# Patient Record
Sex: MALE | Race: BLACK OR AFRICAN AMERICAN | NOT HISPANIC OR LATINO | Employment: PART TIME | URBAN - METROPOLITAN AREA
[De-identification: names, ages, dates, MRNs, and addresses within clinical notes are randomized per-mention and may not be internally consistent; named-entity substitution may affect disease eponyms.]

---

## 2018-08-24 ENCOUNTER — EMPLOYEE HEALTH (OUTPATIENT)
Dept: OCCUPATIONAL MEDICINE | Facility: CLINIC | Age: 23
End: 2018-08-24

## 2018-08-24 ENCOUNTER — HOSPITAL ENCOUNTER (OUTPATIENT)
Facility: MEDICAL CENTER | Age: 23
End: 2018-08-24
Attending: PREVENTIVE MEDICINE
Payer: COMMERCIAL

## 2018-08-24 ENCOUNTER — EH NON-PROVIDER (OUTPATIENT)
Dept: OCCUPATIONAL MEDICINE | Facility: CLINIC | Age: 23
End: 2018-08-24

## 2018-08-24 VITALS
RESPIRATION RATE: 16 BRPM | OXYGEN SATURATION: 97 % | BODY MASS INDEX: 20.49 KG/M2 | WEIGHT: 123 LBS | DIASTOLIC BLOOD PRESSURE: 54 MMHG | HEIGHT: 65 IN | SYSTOLIC BLOOD PRESSURE: 118 MMHG | HEART RATE: 78 BPM | TEMPERATURE: 98.7 F

## 2018-08-24 DIAGNOSIS — Z02.1 PRE-EMPLOYMENT HEALTH SCREENING EXAMINATION: ICD-10-CM

## 2018-08-24 DIAGNOSIS — Z02.1 PRE-EMPLOYMENT DRUG SCREENING: ICD-10-CM

## 2018-08-24 LAB
AMP AMPHETAMINE: NORMAL
BAR BARBITURATES: NORMAL
BZO BENZODIAZEPINES: NORMAL
COC COCAINE: NORMAL
INT CON NEG: NORMAL
INT CON POS: NORMAL
MDMA ECSTASY: NORMAL
MET METHAMPHETAMINES: NORMAL
MTD METHADONE: NORMAL
OPI OPIATES: NORMAL
OXY OXYCODONE: NORMAL
PCP PHENCYCLIDINE: NORMAL
POC URINE DRUG SCREEN OCDRS: NEGATIVE
THC: NORMAL

## 2018-08-24 PROCEDURE — 94375 RESPIRATORY FLOW VOLUME LOOP: CPT | Performed by: PREVENTIVE MEDICINE

## 2018-08-24 PROCEDURE — 86480 TB TEST CELL IMMUN MEASURE: CPT | Performed by: PREVENTIVE MEDICINE

## 2018-08-24 PROCEDURE — 90716 VAR VACCINE LIVE SUBQ: CPT | Performed by: PREVENTIVE MEDICINE

## 2018-08-24 PROCEDURE — 80305 DRUG TEST PRSMV DIR OPT OBS: CPT | Performed by: PREVENTIVE MEDICINE

## 2018-08-24 PROCEDURE — 8915 PR COMPREHENSIVE PHYSICAL: Performed by: PREVENTIVE MEDICINE

## 2018-08-24 NOTE — PROGRESS NOTES
Patient's body mass index is 20.47 kg/m². Exercise and nutrition counseling were performed at this visit.

## 2018-08-25 LAB
M TB TUBERC IFN-G BLD QL: NEGATIVE
M TB TUBERC IFN-G/MITOGEN IGNF BLD: -0
M TB TUBERC IGNF/MITOGEN IGNF CONTROL: 57.37 [IU]/ML
MITOGEN IGNF BCKGRD COR BLD-ACNC: 0.01 [IU]/ML

## 2018-08-27 ENCOUNTER — DOCUMENTATION (OUTPATIENT)
Dept: OCCUPATIONAL MEDICINE | Facility: CLINIC | Age: 23
End: 2018-08-27

## 2018-09-20 ENCOUNTER — IMMUNIZATION (OUTPATIENT)
Dept: OCCUPATIONAL MEDICINE | Facility: CLINIC | Age: 23
End: 2018-09-20

## 2018-09-20 DIAGNOSIS — Z23 NEED FOR VACCINATION: ICD-10-CM

## 2018-09-22 PROCEDURE — 90686 IIV4 VACC NO PRSV 0.5 ML IM: CPT | Performed by: PREVENTIVE MEDICINE

## 2020-05-11 ENCOUNTER — APPOINTMENT (OUTPATIENT)
Dept: URGENT CARE | Facility: CLINIC | Age: 25
End: 2020-05-11
Payer: COMMERCIAL

## 2020-07-02 ENCOUNTER — OFFICE VISIT (OUTPATIENT)
Dept: URGENT CARE | Facility: CLINIC | Age: 25
End: 2020-07-02
Payer: OTHER MISCELLANEOUS

## 2020-07-02 ENCOUNTER — HOSPITAL ENCOUNTER (OUTPATIENT)
Facility: MEDICAL CENTER | Age: 25
End: 2020-07-02
Attending: NURSE PRACTITIONER
Payer: OTHER MISCELLANEOUS

## 2020-07-02 VITALS
SYSTOLIC BLOOD PRESSURE: 132 MMHG | DIASTOLIC BLOOD PRESSURE: 90 MMHG | HEART RATE: 88 BPM | WEIGHT: 116 LBS | HEIGHT: 65 IN | OXYGEN SATURATION: 98 % | TEMPERATURE: 97.9 F | BODY MASS INDEX: 19.33 KG/M2

## 2020-07-02 DIAGNOSIS — B97.89 VIRAL RESPIRATORY ILLNESS: ICD-10-CM

## 2020-07-02 DIAGNOSIS — J02.9 PHARYNGITIS, UNSPECIFIED ETIOLOGY: ICD-10-CM

## 2020-07-02 DIAGNOSIS — R68.83 CHILLS: ICD-10-CM

## 2020-07-02 DIAGNOSIS — B83.9 WORM INFECTION: ICD-10-CM

## 2020-07-02 DIAGNOSIS — J98.8 VIRAL RESPIRATORY ILLNESS: ICD-10-CM

## 2020-07-02 LAB
COVID ORDER STATUS COVID19: NORMAL
FLUAV+FLUBV AG SPEC QL IA: NEGATIVE
INT CON NEG: NEGATIVE
INT CON NEG: NEGATIVE
INT CON POS: POSITIVE
INT CON POS: POSITIVE
S PYO AG THROAT QL: NEGATIVE

## 2020-07-02 PROCEDURE — U0003 INFECTIOUS AGENT DETECTION BY NUCLEIC ACID (DNA OR RNA); SEVERE ACUTE RESPIRATORY SYNDROME CORONAVIRUS 2 (SARS-COV-2) (CORONAVIRUS DISEASE [COVID-19]), AMPLIFIED PROBE TECHNIQUE, MAKING USE OF HIGH THROUGHPUT TECHNOLOGIES AS DESCRIBED BY CMS-2020-01-R: HCPCS

## 2020-07-02 PROCEDURE — 87804 INFLUENZA ASSAY W/OPTIC: CPT | Performed by: NURSE PRACTITIONER

## 2020-07-02 PROCEDURE — 99204 OFFICE O/P NEW MOD 45 MIN: CPT | Performed by: NURSE PRACTITIONER

## 2020-07-02 PROCEDURE — U0004 COV-19 TEST NON-CDC HGH THRU: HCPCS

## 2020-07-02 PROCEDURE — 87880 STREP A ASSAY W/OPTIC: CPT | Performed by: NURSE PRACTITIONER

## 2020-07-02 RX ORDER — PRAZIQUANTEL 600 MG/1
TABLET, FILM COATED ORAL
Qty: 4 TAB | Refills: 0 | Status: SHIPPED | OUTPATIENT
Start: 2020-07-02

## 2020-07-02 ASSESSMENT — ENCOUNTER SYMPTOMS
COUGH: 1
BLOOD IN STOOL: 0
ROS GI COMMENTS: PARASITES
SORE THROAT: 1
CARDIOVASCULAR NEGATIVE: 1
DIARRHEA: 0
VOMITING: 1
CHILLS: 1
SHORTNESS OF BREATH: 0
NAUSEA: 0
ABDOMINAL PAIN: 1

## 2020-07-02 ASSESSMENT — VISUAL ACUITY: OU: 1

## 2020-07-02 NOTE — PROGRESS NOTES
"Subjective:     Oskar Bridges is a 24 y.o. male who presents for Cough (fever, chills, vomiting x yesterday )       Cough   This is a new problem. The problem has been gradually worsening. Associated symptoms include chills and a sore throat. Pertinent negatives include no shortness of breath.     Patient comes in with reports of cough and sore throat for 3 days.  Reports chills and vomiting for 2 days.  Has a decreased appetite.    Reports a \"pinching\" sensation in his abdomen when taking a deep breath.  Denies recent travel or sick contacts.  Prior therapy: None.    Patient also reporting concerns of seeing potential parasites in his stool for the past 2 days.  Patient took a video on his cell phone and showed with what appears to be long worms and shorter worms that are squirming.  Denies recent foreign travel or drinking out of streams, rivers, or ponds.  Denies eating raw or undercooked meat.  Reports he used to weigh 130 pounds in 2019.  Currently wearing someone 111 pounds.    Patient was screened prior to rooming and denied COVID-19 diagnosis or contact with a person who has been diagnosed or is suspected to have COVID-19. During this visit, appropriate PPE was worn, hand hygiene was performed, and the patient and any visitors were masked.     PMH:  has no past medical history on file.    MEDS:   Current Outpatient Medications:   •  praziquantel (BILTRICIDE) 600 MG Tab, Take 2 tablets by mouth. Take another 2 tablets by mouth in 10 days., Disp: 4 Tab, Rfl: 0    ALLERGIES: No Known Allergies    SURGHX: History reviewed. No pertinent surgical history.    SOCHX:  reports that he has never smoked. He has never used smokeless tobacco. He reports that he does not drink alcohol or use drugs.     FH: Reviewed with patient, not pertinent to this visit.    Review of Systems   Constitutional: Positive for chills and malaise/fatigue.   HENT: Positive for sore throat.    Respiratory: Positive for cough. Negative for " "shortness of breath.    Cardiovascular: Negative.    Gastrointestinal: Positive for abdominal pain and vomiting. Negative for blood in stool, diarrhea and nausea.        Parasites   All other systems reviewed and are negative.    Additional details per HPI.      Objective:     /90   Pulse 88   Temp 36.6 °C (97.9 °F)   Ht 1.651 m (5' 5\")   Wt 52.6 kg (116 lb)   SpO2 98%   BMI 19.30 kg/m²     Physical Exam  Vitals signs reviewed.   Constitutional:       General: He is not in acute distress.     Appearance: He is well-developed. He is not ill-appearing or toxic-appearing.   HENT:      Head: Normocephalic.      Right Ear: Tympanic membrane and external ear normal.      Left Ear: Tympanic membrane and external ear normal.      Nose: Nose normal.      Mouth/Throat:      Mouth: Mucous membranes are moist.      Pharynx: Uvula midline. Pharyngeal swelling and posterior oropharyngeal erythema present.   Eyes:      General: Vision grossly intact.      Extraocular Movements: Extraocular movements intact.      Conjunctiva/sclera: Conjunctivae normal.      Pupils: Pupils are equal, round, and reactive to light.   Neck:      Musculoskeletal: Normal range of motion.   Cardiovascular:      Rate and Rhythm: Normal rate and regular rhythm.      Pulses: Normal pulses.      Heart sounds: Normal heart sounds.   Pulmonary:      Effort: Pulmonary effort is normal. No respiratory distress.      Breath sounds: Normal breath sounds. No decreased breath sounds, rhonchi or rales.   Abdominal:      General: Bowel sounds are normal.      Palpations: Abdomen is soft. There is no mass.      Tenderness: There is no abdominal tenderness.   Musculoskeletal: Normal range of motion.         General: No deformity.   Lymphadenopathy:      Cervical: No cervical adenopathy.   Skin:     General: Skin is warm and dry.      Capillary Refill: Capillary refill takes less than 2 seconds.      Coloration: Skin is not pale.   Neurological:      Mental " Status: He is alert and oriented to person, place, and time.      Sensory: No sensory deficit.      Motor: No weakness.      Coordination: Coordination normal.   Psychiatric:         Behavior: Behavior normal. Behavior is cooperative.     Influenza A/B swab: negative     Rapid Strep A swab: negative      Assessment/Plan:     1. Viral respiratory illness    2. Chills  - COVID/SARS CoV-2 PCR; Future  - POCT Influenza A/B    3. Pharyngitis, unspecified etiology  - POCT Rapid Strep A    4. Worm infection  - Complete O&P; Future  - praziquantel (BILTRICIDE) 600 MG Tab; Take 2 tablets by mouth. Take another 2 tablets by mouth in 10 days.  Dispense: 4 Tab; Refill: 0      Discussed likely self-limiting viral etiology for chills and respiratory symptoms and expected course and duration of illness.    COVID test pending. In accordance with CDC guidelines, advise self-isolation until 1) at least 10 days have elapsed since symptom onset, and 2) fever resolved without use of fever-reducing medication plus respiratory symptoms improved for at least 72 hours.    Stool study pending. Rx as above sent electronically. Patient advised not to start medication until he collects the sample.    Differential diagnosis, natural history, supportive care, over-the-counter symptom management per 's instructions, close monitoring, and indications for immediate follow-up discussed.     Patient advised to: Return for 1) Symptoms that worsen/don't improve, or go to ER, 2) Follow up with primary care in 7-10 days.    All questions answered. Patient agrees with the plan of care.    Discharge summary provided. Work note provided.

## 2020-07-02 NOTE — PATIENT INSTRUCTIONS
COVID test pending. In accordance with CDC guidelines, advise self-isolation until 1) at least 10 days have elapsed since symptom onset, and 2) fever resolved without use of fever-reducing medication plus respiratory symptoms improved for at least 72 hours.     Advise close monitoring, supportive measures including increasing fluids and rest, and over-the-counter symptom management per 's instructions. Practice social distancing, wearing a mask, and good hand hygiene.      Viral Respiratory Infection  A respiratory infection is an illness that affects part of the respiratory system, such as the lungs, nose, or throat. A respiratory infection that is caused by a virus is called a viral respiratory infection.  Common types of viral respiratory infections include:  · A cold.  · The flu (influenza).  · A respiratory syncytial virus (RSV) infection.  What are the causes?  This condition is caused by a virus.  What are the signs or symptoms?  Symptoms of this condition include:  · A stuffy or runny nose.  · Yellow or green nasal discharge.  · A cough.  · Sneezing.  · Fatigue.  · Achy muscles.  · A sore throat.  · Sweating or chills.  · A fever.  · A headache.  How is this diagnosed?  This condition may be diagnosed based on:  · Your symptoms.  · A physical exam.  · Testing of nasal swabs.  How is this treated?  This condition may be treated with medicines, such as:  · Antiviral medicine. This may shorten the length of time a person has symptoms.  · Expectorants. These make it easier to cough up mucus.  · Decongestant nasal sprays.  · Acetaminophen or NSAIDs to relieve fever and pain.  Antibiotic medicines are not prescribed for viral infections. This is because antibiotics are designed to kill bacteria. They are not effective against viruses.  Follow these instructions at home:    Managing pain and congestion  · Take over-the-counter and prescription medicines only as told by your health care provider.  · If  you have a sore throat, gargle with a salt-water mixture 3-4 times a day or as needed. To make a salt-water mixture, completely dissolve ½-1 tsp of salt in 1 cup of warm water.  · Use nose drops made from salt water to ease congestion and soften raw skin around your nose.  · Drink enough fluid to keep your urine pale yellow. This helps prevent dehydration and helps loosen up mucus.  General instructions  · Rest as much as possible.  · Do not drink alcohol.  · Do not use any products that contain nicotine or tobacco, such as cigarettes and e-cigarettes. If you need help quitting, ask your health care provider.  · Keep all follow-up visits as told by your health care provider. This is important.  How is this prevented?    · Get an annual flu shot. You may get the flu shot in late summer, fall, or winter. Ask your health care provider when you should get your flu shot.  · Avoid exposing others to your respiratory infection.  ? Stay home from work or school as told by your health care provider.  ? Wash your hands with soap and water often, especially after you cough or sneeze. If soap and water are not available, use alcohol-based hand .  · Avoid contact with people who are sick during cold and flu season. This is generally fall and winter.  Contact a health care provider if:  · Your symptoms last for 10 days or longer.  · Your symptoms get worse over time.  · You have a fever.  · You have severe sinus pain in your face or forehead.  · The glands in your jaw or neck become very swollen.  Get help right away if you:  · Feel pain or pressure in your chest.  · Have shortness of breath.  · Faint or feel like you will faint.  · Have severe and persistent vomiting.  · Feel confused or disoriented.  Summary  · A respiratory infection is an illness that affects part of the respiratory system, such as the lungs, nose, or throat. A respiratory infection that is caused by a virus is called a viral respiratory  infection.  · Common types of viral respiratory infections are a cold, influenza, and respiratory syncytial virus (RSV) infection.  · Symptoms of this condition include a stuffy or runny nose, cough, sneezing, fatigue, achy muscles, sore throat, and fevers or chills.  · Antibiotic medicines are not prescribed for viral infections. This is because antibiotics are designed to kill bacteria. They are not effective against viruses.  This information is not intended to replace advice given to you by your health care provider. Make sure you discuss any questions you have with your health care provider.  Document Released: 09/27/2006 Document Revised: 12/26/2019 Document Reviewed: 01/28/2019  [employer] Patient Education © 2020 [employer]    Intestinal Parasites (Worms)  Your exam shows the presence of intestinal worms. Roundworms and tapeworms are common and cause a variety of symptoms: stomach cramps, diarrhea, nausea, passage of worms in the stool, and allergic reactions. Roundworms can cause hives, asthma, pneumonia, and blockage of the bowels.   Tapeworms usually come from undercooked meat (beef, pork, and fish). Roundworms are picked up through the soil. It is very important that you take the medicine you have been prescribed to destroy these parasites. It is also very important that you see your doctor in follow-up to evaluate the results of your treatment. Sometimes a second course of medicine is needed to get rid of the parasite worms.  Document Released: 12/18/2006 Document Revised: 03/11/2013 Document Reviewed: 06/09/2008  [employer]® Patient Information ©2013 [employer].

## 2020-07-02 NOTE — LETTER
July 2, 2020         Patient: Oskar Bridges   YOB: 1995   Date of Visit: 7/2/2020           To Whom it May Concern:    Oskar Bridges was seen in my clinic on 7/2/2020. COVID test pending. In accordance with CDC guidelines, advise self-isolation until 1) at least 10 days have elapsed since symptom onset, and 2) fever resolved without use of fever-reducing medication plus respiratory symptoms improved for at least 72 hours.     If you have any questions or concerns, please don't hesitate to call.        Sincerely,           SANTA Bernabe.  Electronically Signed

## 2020-07-03 LAB
SARS-COV-2 RNA RESP QL NAA+PROBE: NOTDETECTED
SPECIMEN SOURCE: NORMAL

## 2020-07-13 LAB
O+P SPEC MICRO: ABNORMAL
O+P STL CONC: ABNORMAL

## 2021-02-11 ENCOUNTER — OFFICE VISIT (OUTPATIENT)
Dept: URGENT CARE | Facility: PHYSICIAN GROUP | Age: 26
End: 2021-02-11
Payer: OTHER MISCELLANEOUS

## 2021-02-11 VITALS
DIASTOLIC BLOOD PRESSURE: 60 MMHG | BODY MASS INDEX: 23.32 KG/M2 | HEART RATE: 80 BPM | TEMPERATURE: 97 F | WEIGHT: 140 LBS | OXYGEN SATURATION: 98 % | RESPIRATION RATE: 18 BRPM | HEIGHT: 65 IN | SYSTOLIC BLOOD PRESSURE: 120 MMHG

## 2021-02-11 DIAGNOSIS — R53.83 FATIGUE, UNSPECIFIED TYPE: ICD-10-CM

## 2021-02-11 DIAGNOSIS — R11.0 NAUSEA: ICD-10-CM

## 2021-02-11 PROCEDURE — 99214 OFFICE O/P EST MOD 30 MIN: CPT | Performed by: PHYSICIAN ASSISTANT

## 2021-02-11 RX ORDER — ONDANSETRON 4 MG/1
4 TABLET, FILM COATED ORAL EVERY 4 HOURS PRN
Qty: 20 TABLET | Refills: 0 | Status: SHIPPED | OUTPATIENT
Start: 2021-02-11 | End: 2021-02-16

## 2021-02-11 ASSESSMENT — ENCOUNTER SYMPTOMS
NAUSEA: 1
COUGH: 0
JOINT SWELLING: 0
FEVER: 0
FATIGUE: 1
VOMITING: 0
CHILLS: 0
SORE THROAT: 0
ABDOMINAL PAIN: 0
CHANGE IN BOWEL HABIT: 0
WEAKNESS: 1

## 2021-02-12 ENCOUNTER — HOSPITAL ENCOUNTER (OUTPATIENT)
Dept: LAB | Facility: MEDICAL CENTER | Age: 26
End: 2021-02-12
Attending: PHYSICIAN ASSISTANT
Payer: COMMERCIAL

## 2021-02-12 DIAGNOSIS — R53.83 FATIGUE, UNSPECIFIED TYPE: ICD-10-CM

## 2021-02-12 LAB
ALBUMIN SERPL BCP-MCNC: 4.3 G/DL (ref 3.2–4.9)
ALBUMIN/GLOB SERPL: 1.7 G/DL
ALP SERPL-CCNC: 57 U/L (ref 30–99)
ALT SERPL-CCNC: 20 U/L (ref 2–50)
ANION GAP SERPL CALC-SCNC: 8 MMOL/L (ref 7–16)
AST SERPL-CCNC: 18 U/L (ref 12–45)
BASOPHILS # BLD AUTO: 0.6 % (ref 0–1.8)
BASOPHILS # BLD: 0.04 K/UL (ref 0–0.12)
BILIRUB SERPL-MCNC: 0.4 MG/DL (ref 0.1–1.5)
BUN SERPL-MCNC: 16 MG/DL (ref 8–22)
CALCIUM SERPL-MCNC: 9.1 MG/DL (ref 8.5–10.5)
CHLORIDE SERPL-SCNC: 103 MMOL/L (ref 96–112)
CO2 SERPL-SCNC: 26 MMOL/L (ref 20–33)
CREAT SERPL-MCNC: 0.88 MG/DL (ref 0.5–1.4)
EOSINOPHIL # BLD AUTO: 0.24 K/UL (ref 0–0.51)
EOSINOPHIL NFR BLD: 3.6 % (ref 0–6.9)
ERYTHROCYTE [DISTWIDTH] IN BLOOD BY AUTOMATED COUNT: 42.2 FL (ref 35.9–50)
GLOBULIN SER CALC-MCNC: 2.6 G/DL (ref 1.9–3.5)
GLUCOSE SERPL-MCNC: 88 MG/DL (ref 65–99)
HCT VFR BLD AUTO: 44.4 % (ref 42–52)
HGB BLD-MCNC: 14.9 G/DL (ref 14–18)
IMM GRANULOCYTES # BLD AUTO: 0.02 K/UL (ref 0–0.11)
IMM GRANULOCYTES NFR BLD AUTO: 0.3 % (ref 0–0.9)
LYMPHOCYTES # BLD AUTO: 2.84 K/UL (ref 1–4.8)
LYMPHOCYTES NFR BLD: 43 % (ref 22–41)
MCH RBC QN AUTO: 32 PG (ref 27–33)
MCHC RBC AUTO-ENTMCNC: 33.6 G/DL (ref 33.7–35.3)
MCV RBC AUTO: 95.3 FL (ref 81.4–97.8)
MONOCYTES # BLD AUTO: 0.36 K/UL (ref 0–0.85)
MONOCYTES NFR BLD AUTO: 5.4 % (ref 0–13.4)
NEUTROPHILS # BLD AUTO: 3.11 K/UL (ref 1.82–7.42)
NEUTROPHILS NFR BLD: 47.1 % (ref 44–72)
NRBC # BLD AUTO: 0 K/UL
NRBC BLD-RTO: 0 /100 WBC
PLATELET # BLD AUTO: 240 K/UL (ref 164–446)
PMV BLD AUTO: 10.4 FL (ref 9–12.9)
POTASSIUM SERPL-SCNC: 3.9 MMOL/L (ref 3.6–5.5)
PROT SERPL-MCNC: 6.9 G/DL (ref 6–8.2)
RBC # BLD AUTO: 4.66 M/UL (ref 4.7–6.1)
SODIUM SERPL-SCNC: 137 MMOL/L (ref 135–145)
TSH SERPL DL<=0.005 MIU/L-ACNC: 0.48 UIU/ML (ref 0.38–5.33)
WBC # BLD AUTO: 6.6 K/UL (ref 4.8–10.8)

## 2021-02-12 PROCEDURE — 85025 COMPLETE CBC W/AUTO DIFF WBC: CPT

## 2021-02-12 PROCEDURE — 36415 COLL VENOUS BLD VENIPUNCTURE: CPT

## 2021-02-12 PROCEDURE — 84443 ASSAY THYROID STIM HORMONE: CPT

## 2021-02-12 PROCEDURE — 80053 COMPREHEN METABOLIC PANEL: CPT

## 2021-02-12 NOTE — PROGRESS NOTES
Subjective:      Oskar Bridges is a 25 y.o. male who presents with Fatigue (PT STATES HIS LEG IS TIRED, X5 MONTHS)            Unspecified weakness, fatigue, nausea for 5 months.  Comes and goes.  He states he feels tired all the time.  He is also having some erectile dysfunction.  He has no pertinent past medical history.  He denies fever, chest pains, palpitations.    Fatigue  This is a new problem. The current episode started more than 1 month ago (5 months). The problem occurs daily. The problem has been waxing and waning. Associated symptoms include fatigue, nausea and weakness. Pertinent negatives include no abdominal pain, change in bowel habit, chest pain, chills, congestion, coughing, fever, joint swelling, rash, sore throat or vomiting. Nothing aggravates the symptoms. He has tried nothing for the symptoms. The treatment provided no relief.     PMH:  has no past medical history on file.  MEDS:   Current Outpatient Medications:   •  ondansetron (ZOFRAN) 4 MG Tab tablet, Take 1 tablet by mouth every four hours as needed for Nausea/Vomiting for up to 5 days., Disp: 20 tablet, Rfl: 0  •  praziquantel (BILTRICIDE) 600 MG Tab, Take 2 tablets by mouth. Take another 2 tablets by mouth in 10 days. (Patient not taking: Reported on 2/11/2021), Disp: 4 Tab, Rfl: 0  ALLERGIES: No Known Allergies  SURGHX: No past surgical history on file.  SOCHX:  reports that he has never smoked. He has never used smokeless tobacco. He reports that he does not drink alcohol and does not use drugs.  FH: family history is not on file.    Review of Systems   Constitutional: Positive for fatigue. Negative for chills and fever.   HENT: Negative for congestion and sore throat.    Respiratory: Negative for cough.    Cardiovascular: Negative for chest pain.   Gastrointestinal: Positive for nausea. Negative for abdominal pain, change in bowel habit and vomiting.   Musculoskeletal: Negative for joint swelling.   Skin: Negative for rash.  "  Neurological: Positive for weakness.       Medications, Allergies, and current problem list reviewed today in Epic     Objective:     /60   Pulse 80   Temp 36.1 °C (97 °F) (Temporal)   Resp 18   Ht 1.651 m (5' 5\")   Wt 63.5 kg (140 lb)   SpO2 98%   BMI 23.30 kg/m²      Physical Exam  Vitals and nursing note reviewed.   Constitutional:       General: He is not in acute distress.     Appearance: Normal appearance. He is well-developed. He is not ill-appearing, toxic-appearing or diaphoretic.   HENT:      Head: Normocephalic and atraumatic.      Right Ear: Tympanic membrane, ear canal and external ear normal.      Left Ear: Tympanic membrane, ear canal and external ear normal.      Nose: Nose normal. No congestion or rhinorrhea.      Mouth/Throat:      Mouth: Mucous membranes are moist.      Pharynx: Oropharynx is clear. No oropharyngeal exudate or posterior oropharyngeal erythema.   Eyes:      General:         Right eye: No discharge.         Left eye: No discharge.      Conjunctiva/sclera: Conjunctivae normal.   Cardiovascular:      Rate and Rhythm: Normal rate and regular rhythm.      Pulses: Normal pulses.      Heart sounds: Normal heart sounds. No murmur.   Pulmonary:      Effort: Pulmonary effort is normal. No respiratory distress.      Breath sounds: Normal breath sounds. No wheezing, rhonchi or rales.   Chest:      Chest wall: No tenderness.   Musculoskeletal:         General: No swelling or tenderness.      Cervical back: Normal range of motion and neck supple.      Right lower leg: No edema.      Left lower leg: No edema.   Lymphadenopathy:      Cervical: No cervical adenopathy.   Skin:     General: Skin is warm and dry.   Neurological:      Mental Status: He is alert and oriented to person, place, and time.   Psychiatric:         Mood and Affect: Mood normal.         Behavior: Behavior normal.         Thought Content: Thought content normal.         Judgment: Judgment normal.               "   Assessment/Plan:         1. Fatigue, unspecified type  CBC WITH DIFFERENTIAL    Comp Metabolic Panel    TSH WITH REFLEX TO FT4   2. Nausea  ondansetron (ZOFRAN) 4 MG Tab tablet     Fatigue, nausea, generalized weakness for over 5 months.  No pertinent past medical history.  Vital signs normal, exam unremarkable.  Patient denies chest pain, palpitations, dizziness, blurry vision.  Patient has never had medical evaluation or lab work completed.  I will complete some basic lab panel and refer to specialty as indicated.  OTC meds and conservative measures as discussed    Return to clinic or go to ED if symptoms worsen or persist. Indications for ED discussed at length. Patient/Parent/Guardian voices understanding. Follow-up with your primary care provider in 3-5 days. Red flag symptoms discussed. All side effects of medication discussed including allergic response, GI upset, tendon injury, rash, sedation etc.    Please note that this dictation was created using voice recognition software. I have made every reasonable attempt to correct obvious errors, but I expect that there are errors of grammar and possibly content that I did not discover before finalizing the note.

## 2021-07-16 ENCOUNTER — HOSPITAL ENCOUNTER (OUTPATIENT)
Facility: MEDICAL CENTER | Age: 26
End: 2021-07-16
Attending: PHYSICIAN ASSISTANT
Payer: OTHER MISCELLANEOUS

## 2021-07-16 PROCEDURE — 84403 ASSAY OF TOTAL TESTOSTERONE: CPT

## 2021-07-17 LAB — TESTOST SERPL-MCNC: 570 NG/DL (ref 175–781)
